# Patient Record
Sex: FEMALE | Race: WHITE | NOT HISPANIC OR LATINO | Employment: OTHER | URBAN - METROPOLITAN AREA
[De-identification: names, ages, dates, MRNs, and addresses within clinical notes are randomized per-mention and may not be internally consistent; named-entity substitution may affect disease eponyms.]

---

## 2023-11-28 ENCOUNTER — APPOINTMENT (OUTPATIENT)
Dept: PRIMARY CARE | Facility: CLINIC | Age: 63
End: 2023-11-28
Payer: MEDICARE

## 2023-12-11 ENCOUNTER — OFFICE VISIT (OUTPATIENT)
Dept: PRIMARY CARE | Facility: CLINIC | Age: 63
End: 2023-12-11
Payer: MEDICARE

## 2023-12-11 VITALS
SYSTOLIC BLOOD PRESSURE: 118 MMHG | HEIGHT: 62 IN | BODY MASS INDEX: 40.3 KG/M2 | HEART RATE: 71 BPM | OXYGEN SATURATION: 96 % | WEIGHT: 219 LBS | DIASTOLIC BLOOD PRESSURE: 80 MMHG

## 2023-12-11 DIAGNOSIS — I10 PRIMARY HYPERTENSION: ICD-10-CM

## 2023-12-11 DIAGNOSIS — Z76.89 ENCOUNTER TO ESTABLISH CARE: Primary | ICD-10-CM

## 2023-12-11 DIAGNOSIS — D68.51 FACTOR V LEIDEN MUTATION (MULTI): ICD-10-CM

## 2023-12-11 PROBLEM — E55.9 VITAMIN D DEFICIENCY: Status: ACTIVE | Noted: 2023-12-11

## 2023-12-11 PROBLEM — J32.0 LEFT MAXILLARY SINUSITIS: Status: ACTIVE | Noted: 2023-12-11

## 2023-12-11 PROCEDURE — 1036F TOBACCO NON-USER: CPT

## 2023-12-11 PROCEDURE — 99203 OFFICE O/P NEW LOW 30 MIN: CPT

## 2023-12-11 PROCEDURE — 3079F DIAST BP 80-89 MM HG: CPT

## 2023-12-11 PROCEDURE — 1159F MED LIST DOCD IN RCRD: CPT

## 2023-12-11 PROCEDURE — 1126F AMNT PAIN NOTED NONE PRSNT: CPT

## 2023-12-11 PROCEDURE — 3074F SYST BP LT 130 MM HG: CPT

## 2023-12-11 PROCEDURE — 99213 OFFICE O/P EST LOW 20 MIN: CPT

## 2023-12-11 RX ORDER — PHENYLEPHRINE HCL 10 MG
1 TABLET ORAL EVERY 24 HOURS
COMMUNITY

## 2023-12-11 RX ORDER — LISINOPRIL AND HYDROCHLOROTHIAZIDE 20; 25 MG/1; MG/1
1 TABLET ORAL EVERY 24 HOURS
Qty: 90 TABLET | Refills: 2 | Status: SHIPPED | OUTPATIENT
Start: 2023-12-11

## 2023-12-11 RX ORDER — LISINOPRIL AND HYDROCHLOROTHIAZIDE 20; 25 MG/1; MG/1
1 TABLET ORAL EVERY 24 HOURS
COMMUNITY
Start: 2017-12-26 | End: 2023-12-11 | Stop reason: SDUPTHER

## 2023-12-11 ASSESSMENT — PAIN SCALES - GENERAL: PAINLEVEL: 0-NO PAIN

## 2023-12-11 ASSESSMENT — ENCOUNTER SYMPTOMS
DIZZINESS: 0
PALPITATIONS: 0
LIGHT-HEADEDNESS: 0
FATIGUE: 0
SHORTNESS OF BREATH: 0

## 2023-12-11 ASSESSMENT — PATIENT HEALTH QUESTIONNAIRE - PHQ9
2. FEELING DOWN, DEPRESSED OR HOPELESS: NOT AT ALL
1. LITTLE INTEREST OR PLEASURE IN DOING THINGS: NOT AT ALL
SUM OF ALL RESPONSES TO PHQ9 QUESTIONS 1 AND 2: 0

## 2023-12-11 NOTE — PROGRESS NOTES
"Subjective   Patient ID: Ace Mirza is a 63 y.o. female who presents for Establish Care (Pt is here to establish care with no concerns and needs refills /la).    Hx HTN, vit-D deficiency,  + Factor V Leiden (no hx of VTE's), BCC    Other providers: Previous PCP Brionna Dutta, CNP, Dermatology for recurrent BCC every 6 months for skin checks, Dr. Gillian Garrett Ob/gyn).     HTN: controlled. On lisinopril 20 - 25 mg. No medication side effects. Home BP rarely checks. Denies chest pain, heart palpitations, SOB, dizziness, headaches, changes of vision, syncope, leg swelling.          Review of Systems   Constitutional:  Negative for fatigue.   Eyes:  Negative for visual disturbance.   Respiratory:  Negative for shortness of breath.    Cardiovascular:  Negative for chest pain, palpitations and leg swelling.   Neurological:  Negative for dizziness, syncope and light-headedness.       Objective   /80   Pulse 71   Ht 1.575 m (5' 2\")   Wt 99.3 kg (219 lb)   SpO2 96%   BMI 40.06 kg/m²     Physical Exam  Constitutional:       General: She is not in acute distress.     Appearance: Normal appearance.   Cardiovascular:      Rate and Rhythm: Normal rate and regular rhythm.      Heart sounds: No murmur heard.  Pulmonary:      Effort: Pulmonary effort is normal.      Breath sounds: Normal breath sounds. No wheezing, rhonchi or rales.   Skin:     General: Skin is warm and dry.      Findings: No rash.   Neurological:      Mental Status: She is alert.   Psychiatric:         Mood and Affect: Mood and affect normal.         Assessment/Plan   Diagnoses and all orders for this visit:  Encounter to establish care  Primary hypertension  -     lisinopriL-hydrochlorothiazide 20-25 mg tablet; Take 1 tablet by mouth once every 24 hours.  Factor V Leiden mutation (CMS/HCC)  -     Referral to Hematology and Oncology; Future  -Discussed increase risk of clotting, on long flights/travel should wear compression stockings.   -Follow-up one " month for Welcome to Medicare.

## 2024-01-15 ENCOUNTER — OFFICE VISIT (OUTPATIENT)
Dept: PRIMARY CARE | Facility: CLINIC | Age: 64
End: 2024-01-15
Payer: MEDICARE

## 2024-01-15 ENCOUNTER — LAB (OUTPATIENT)
Dept: LAB | Facility: LAB | Age: 64
End: 2024-01-15
Payer: MEDICARE

## 2024-01-15 VITALS
SYSTOLIC BLOOD PRESSURE: 126 MMHG | OXYGEN SATURATION: 97 % | WEIGHT: 221 LBS | HEART RATE: 76 BPM | DIASTOLIC BLOOD PRESSURE: 82 MMHG | HEIGHT: 62 IN | BODY MASS INDEX: 40.67 KG/M2

## 2024-01-15 DIAGNOSIS — Z13.6 SCREENING FOR CARDIOVASCULAR CONDITION: ICD-10-CM

## 2024-01-15 DIAGNOSIS — Z11.59 NEED FOR HEPATITIS C SCREENING TEST: ICD-10-CM

## 2024-01-15 DIAGNOSIS — Z80.8 FAMILY HISTORY OF THYROID CANCER: ICD-10-CM

## 2024-01-15 DIAGNOSIS — D68.51 FACTOR V LEIDEN MUTATION (MULTI): ICD-10-CM

## 2024-01-15 DIAGNOSIS — E55.9 VITAMIN D DEFICIENCY: ICD-10-CM

## 2024-01-15 DIAGNOSIS — I10 ESSENTIAL HYPERTENSION: ICD-10-CM

## 2024-01-15 DIAGNOSIS — Z23 NEED FOR VACCINATION: ICD-10-CM

## 2024-01-15 DIAGNOSIS — Z13.1 DIABETES MELLITUS SCREENING: ICD-10-CM

## 2024-01-15 DIAGNOSIS — Z00.00 ROUTINE GENERAL MEDICAL EXAMINATION AT HEALTH CARE FACILITY: Primary | ICD-10-CM

## 2024-01-15 LAB
25(OH)D3 SERPL-MCNC: 79 NG/ML (ref 31–100)
ALBUMIN SERPL-MCNC: 4.4 G/DL (ref 3.5–5)
ALP BLD-CCNC: 72 U/L (ref 35–125)
ALT SERPL-CCNC: 51 U/L (ref 5–40)
ANION GAP SERPL CALC-SCNC: 12 MMOL/L
AST SERPL-CCNC: 28 U/L (ref 5–40)
BASOPHILS # BLD AUTO: 0.09 X10*3/UL (ref 0–0.1)
BASOPHILS NFR BLD AUTO: 1.1 %
BILIRUB SERPL-MCNC: 0.6 MG/DL (ref 0.1–1.2)
BUN SERPL-MCNC: 19 MG/DL (ref 8–25)
CALCIUM SERPL-MCNC: 9.9 MG/DL (ref 8.5–10.4)
CHLORIDE SERPL-SCNC: 105 MMOL/L (ref 97–107)
CHOLEST SERPL-MCNC: 179 MG/DL (ref 133–200)
CHOLEST/HDLC SERPL: 4.7 {RATIO}
CO2 SERPL-SCNC: 26 MMOL/L (ref 24–31)
CREAT SERPL-MCNC: 0.8 MG/DL (ref 0.4–1.6)
EGFRCR SERPLBLD CKD-EPI 2021: 82 ML/MIN/1.73M*2
EOSINOPHIL # BLD AUTO: 0.08 X10*3/UL (ref 0–0.7)
EOSINOPHIL NFR BLD AUTO: 1 %
ERYTHROCYTE [DISTWIDTH] IN BLOOD BY AUTOMATED COUNT: 13.3 % (ref 11.5–14.5)
GLUCOSE SERPL-MCNC: 104 MG/DL (ref 65–99)
HCT VFR BLD AUTO: 44.4 % (ref 36–46)
HCV AB SER QL: NONREACTIVE
HDLC SERPL-MCNC: 38 MG/DL
HGB BLD-MCNC: 14.5 G/DL (ref 12–16)
IMM GRANULOCYTES # BLD AUTO: 0.06 X10*3/UL (ref 0–0.7)
IMM GRANULOCYTES NFR BLD AUTO: 0.7 % (ref 0–0.9)
LDLC SERPL CALC-MCNC: 101 MG/DL (ref 65–130)
LYMPHOCYTES # BLD AUTO: 2.65 X10*3/UL (ref 1.2–4.8)
LYMPHOCYTES NFR BLD AUTO: 32.6 %
MCH RBC QN AUTO: 31.7 PG (ref 26–34)
MCHC RBC AUTO-ENTMCNC: 32.7 G/DL (ref 32–36)
MCV RBC AUTO: 97 FL (ref 80–100)
MONOCYTES # BLD AUTO: 0.44 X10*3/UL (ref 0.1–1)
MONOCYTES NFR BLD AUTO: 5.4 %
NEUTROPHILS # BLD AUTO: 4.81 X10*3/UL (ref 1.2–7.7)
NEUTROPHILS NFR BLD AUTO: 59.2 %
NRBC BLD-RTO: 0 /100 WBCS (ref 0–0)
PLATELET # BLD AUTO: 348 X10*3/UL (ref 150–450)
POTASSIUM SERPL-SCNC: 4.3 MMOL/L (ref 3.4–5.1)
PROT SERPL-MCNC: 6.7 G/DL (ref 5.9–7.9)
RBC # BLD AUTO: 4.58 X10*6/UL (ref 4–5.2)
SODIUM SERPL-SCNC: 143 MMOL/L (ref 133–145)
TRIGL SERPL-MCNC: 202 MG/DL (ref 40–150)
TSH SERPL DL<=0.05 MIU/L-ACNC: 1.95 MIU/L (ref 0.27–4.2)
WBC # BLD AUTO: 8.1 X10*3/UL (ref 4.4–11.3)

## 2024-01-15 PROCEDURE — 84443 ASSAY THYROID STIM HORMONE: CPT

## 2024-01-15 PROCEDURE — 85025 COMPLETE CBC W/AUTO DIFF WBC: CPT

## 2024-01-15 PROCEDURE — 1125F AMNT PAIN NOTED PAIN PRSNT: CPT

## 2024-01-15 PROCEDURE — 1036F TOBACCO NON-USER: CPT

## 2024-01-15 PROCEDURE — 36415 COLL VENOUS BLD VENIPUNCTURE: CPT

## 2024-01-15 PROCEDURE — G0402 INITIAL PREVENTIVE EXAM: HCPCS

## 2024-01-15 PROCEDURE — G0009 ADMIN PNEUMOCOCCAL VACCINE: HCPCS

## 2024-01-15 PROCEDURE — 3079F DIAST BP 80-89 MM HG: CPT

## 2024-01-15 PROCEDURE — 80061 LIPID PANEL: CPT

## 2024-01-15 PROCEDURE — 82306 VITAMIN D 25 HYDROXY: CPT

## 2024-01-15 PROCEDURE — 1159F MED LIST DOCD IN RCRD: CPT

## 2024-01-15 PROCEDURE — 80053 COMPREHEN METABOLIC PANEL: CPT

## 2024-01-15 PROCEDURE — 86803 HEPATITIS C AB TEST: CPT

## 2024-01-15 PROCEDURE — 3074F SYST BP LT 130 MM HG: CPT

## 2024-01-15 PROCEDURE — 90677 PCV20 VACCINE IM: CPT

## 2024-01-15 ASSESSMENT — ENCOUNTER SYMPTOMS
PALPITATIONS: 0
DYSURIA: 0
DIFFICULTY URINATING: 0
BLOOD IN STOOL: 0
MYALGIAS: 0
SHORTNESS OF BREATH: 0
NAUSEA: 0
FATIGUE: 0
ADENOPATHY: 0
ARTHRALGIAS: 1
HEMATURIA: 0
COUGH: 0
LIGHT-HEADEDNESS: 0
DIAPHORESIS: 0
VOMITING: 0
WHEEZING: 0
SORE THROAT: 0
DIARRHEA: 0
FEVER: 0

## 2024-01-15 ASSESSMENT — PAIN SCALES - GENERAL: PAINLEVEL: 6

## 2024-01-15 NOTE — PROGRESS NOTES
Subjective   Patient ID: Ace Mirza is a 63 y.o. female who presents for Annual Exam.    PAP 12-5-19 NEG HPV NEG H/O cone biopsy 30 years ago.  MAMMO 1-30-23, will do fast breast MRI.  DEXA 1-30-23  COLON 2021 good for 10 years.     CHAPERONE: DECLINED. Had physical yesterday, BP normal. Has gained weight.     Cramping has not been as bad this past year. Had ultrasound last year with large fibroid uterus. No VB.     Gets mams at [facility], had biopsy on the left, has a chip. Has a h/o basal cell carcinoma, had some removed on leg.     Stopped periods with fibroid removal 5 years ago. Had a fibroid removed vaginally. No VB.     - passed one year ago, Covid, c/s x2. 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 Takes vit D.     Father liposarcoma, passed. Mom living, healthy. no female cancers.     No vaginal dryness.       Review of Systems   Constitutional:  Positive for unexpected weight change.   Psychiatric/Behavioral:  Positive for sleep disturbance.    All other systems reviewed and are negative.      Objective   Constitutional: Alert and in no acute distress. Well developed, well nourished.  Neck: no neck asymmetry. Supple and thyroid not enlarged and there were no palpable thyroid nodules.  Chest: Breasts normal appearance, no nipple discharge and no skin changes and palpation of breasts and axillae: no palpable mass and no axillary lymphadenopathy.  Abdomen:VERTICAL MIDLINE INCISION. soft nontender; no abdominal mass palpated, no organomegaly and no hernias.  Genitourinary: external genitalia: normal, no inguinal lymphadenopathy, Bartholin's urethral and [place]'s glands: normal, urethra: normal and bladder: normal on palpation.  Vagina: normal.  Cervix: normal.  Uterus: normal.   Right adnexa/parametria: normal.  Left adnexa/parametria: normal.  Skin: normal skin color and pigmentation, normal skin turgor and no rash.  Psychiatric: alert and oriented x 3, affect normal to patient baseline and mood  appropriate.     Assessment/Plan   -Pap-HPV  -Lio-Ranjit, fast breast MRI.  -Discussed weight gain. Mentioned Whole[employer].  -pelvic ultrasound follow fibroids.

## 2024-01-15 NOTE — PROGRESS NOTES
[facility]: FAMILY MEDICINE  MEDICARE WELLNESS EXAM      Ace Mirza is a 63 y.o. female that is presenting today for Annual Exam (Pt is here for medicare physical /la).    Subjective   Hx HTN, vit-D deficiency, + Factor V Leiden (no hx of VTE's), BCC     Other providers: Previous PCP Brionna Dutta CNP, Dermatology for recurrent BCC every 6 months for skin checks, Dr. Gillian Garrett Ob/gyn), referral placed for hematologist (2/2024)     HTN: controlled. On lisinopril 20 - 25 mg. No medication side effects. Home BP rarely checks. Denies chest pain, heart palpitations, SOB, dizziness, headaches, changes of vision, syncope, leg swelling    Denies concerns/changes in hearing.       Review of Systems   Constitutional:  Negative for diaphoresis, fatigue and fever.   HENT:  Negative for congestion, hearing loss and sore throat.    Eyes:  Negative for visual disturbance.   Respiratory:  Negative for cough, shortness of breath and wheezing.    Cardiovascular:  Negative for chest pain, palpitations and leg swelling.   Gastrointestinal:  Negative for blood in stool, diarrhea, nausea and vomiting.   Genitourinary:  Negative for difficulty urinating, dysuria and hematuria.   Musculoskeletal:  Positive for arthralgias. Negative for myalgias.   Skin:  Negative for rash.   Allergic/Immunologic: Negative for immunocompromised state.   Neurological:  Negative for syncope and light-headedness.   Hematological:  Negative for adenopathy.   Psychiatric/Behavioral:  Negative for suicidal ideas.      ACTIVITIES OF DAILY LIVING:  Basic ADLs:  Problems with Bathing, Dressing, Toileting, Transferring, Continence, Feeding No  Instrumental ADLs:  Problems with Ability to use phone, Shopping, Cooking, House-keeping, Laundry, Transportation, Medication Management, Finance Management No    Advance Care Planning   Advanced Care Planning was discussed with patient:  The patient has an active advanced care plan on file. The patient has an  active surrogate decision-maker on file. The patient does not have an advanced care plan on file.    History    Past Medical History:   Diagnosis Date    Personal history of other diseases of the circulatory system     History of hypertension    Personal history of other malignant neoplasm of skin     History of basal cell carcinoma (BCC)     Past Surgical History:   Procedure Laterality Date    BREAST LUMPECTOMY  2018    Breast Surgery Lumpectomy     SECTION, CLASSIC  2018     Section    OTHER SURGICAL HISTORY  2018    Cervical Conization By Laser    OTHER SURGICAL HISTORY  2018    Uterine Surgery    SKIN CANCER DESTRUCTION       Family History   Problem Relation Name Age of Onset    Lung disease Mother      Stroke Mother      Pulmonary fibrosis Mother      Melanoma Mother      Hypertension Mother      Arthritis Mother      Other (liposarcoma) Father      Kidney disease Father      Diabetes Father      Thyroid cancer Sister      Melanoma Sister      Colon cancer Mother's Brother      Breast cancer Neg Hx      Uterine cancer Neg Hx      Cervical cancer Neg Hx      Ovarian cancer Neg Hx      Prostate cancer Neg Hx      Heart attack Neg Hx       Allergies   Allergen Reactions    Amoxicillin GI Upset, Nausea Only and Nausea/vomiting    Sulfa (Sulfonamide Antibiotics) Rash     Current Outpatient Medications on File Prior to Visit   Medication Sig Dispense Refill    D3-red wine-resveratrol-malt 5,000-200 unit-mg capsule once every 24 hours.      lisinopriL-hydrochlorothiazide 20-25 mg tablet Take 1 tablet by mouth once every 24 hours. 90 tablet 2    omega 3-dha-epa-fish oil 600 mg-216 mg- 324 mg-1,200 mg capsule 1 capsule (1,200 mg) once every 24 hours.       No current facility-administered medications on file prior to visit.     Immunization History   Administered Date(s) Administered    Flu vaccine (IIV4), preservative free *Check age/dose* 10/06/2022, 10/18/2023    Flu vaccine,  quadrivalent, no egg protein, age 6 month or greater (FLUCELVAX) 10/15/2020    Influenza, injectable, quadrivalent 12/23/2013, 10/09/2015, 12/23/2016, 09/05/2017, 10/17/2018, 11/15/2021    Influenza, seasonal, injectable 10/13/2003, 10/13/2008, 09/21/2009, 09/15/2010, 11/15/2011, 08/27/2012, 10/20/2014    Pfizer Purple Cap SARS-CoV-2 03/17/2021, 04/16/2021, 12/10/2021    RSV, 60 Years And Older (AREXVY) 10/18/2023    Td (adult), unspecified 12/23/2016    Tdap vaccine, age 7 year and older (BOOSTRIX) 04/13/2007    Zoster vaccine, recombinant, adult (SHINGRIX) 10/15/2020, 01/13/2021     Patient's medical history was reviewed and updated either before or during this encounter.    Objective   Vitals:    01/15/24 0939   BP: 126/82   Pulse: 76   SpO2: 97%      Physical Exam  Constitutional:       General: She is not in acute distress.     Appearance: Normal appearance.   HENT:      Right Ear: Tympanic membrane and ear canal normal.      Left Ear: Tympanic membrane and ear canal normal.      Nose: Nose normal.      Mouth/Throat:      Mouth: Mucous membranes are moist.      Pharynx: Oropharynx is clear. No oropharyngeal exudate or posterior oropharyngeal erythema.   Eyes:      Extraocular Movements: Extraocular movements intact.      Conjunctiva/sclera: Conjunctivae normal.      Pupils: Pupils are equal, round, and reactive to light.   Neck:      Thyroid: No thyroid mass or thyromegaly.   Cardiovascular:      Rate and Rhythm: Normal rate and regular rhythm.      Pulses: Normal pulses.           Posterior tibial pulses are 2+ on the right side and 2+ on the left side.      Heart sounds: No murmur heard.     No gallop.   Pulmonary:      Effort: Pulmonary effort is normal.      Breath sounds: No wheezing, rhonchi or rales.   Abdominal:      General: Bowel sounds are normal.      Palpations: Abdomen is soft. There is no hepatomegaly, splenomegaly or mass.      Tenderness: There is no abdominal tenderness. There is no guarding.    Musculoskeletal:         General: Normal range of motion.   Lymphadenopathy:      Cervical: No cervical adenopathy.   Skin:     General: Skin is warm.      Findings: No rash.   Neurological:      General: No focal deficit present.      Mental Status: She is alert.   Psychiatric:         Mood and Affect: Mood normal.         Thought Content: Thought content normal.         Assessment/Plan    Diagnoses and all orders for this visit:  Routine general medical examination at health care facility  Vitamin D deficiency  -     Vitamin D 25-Hydroxy,Total (for eval of Vitamin D levels); Future  Essential hypertension  -     CBC and Auto Differential; Future  Factor V Leiden mutation (CMS/HCC)  -     CBC and Auto Differential; Future  Diabetes mellitus screening  -     Comprehensive Metabolic Panel; Future  Screening for cardiovascular condition  -     Lipid panel; Future  Need for vaccination  -     Pneumococcal conjugate vaccine 20-valent IM  Need for hepatitis C screening test  -     Hepatitis C antibody; Future  Family history of thyroid cancer  -     Tsh With Reflex To Free T4 If Abnormal; Future    Patient Active Problem List   Diagnosis    Essential hypertension    Left maxillary sinusitis    Vitamin D deficiency    Factor V Leiden mutation (CMS/HCC)    Family history of thyroid cancer       Diagnoses and all orders for this visit:  Routine general medical examination at [facility] care facility  Vitamin D deficiency  -     Vitamin D 25-Hydroxy,Total (for eval of Vitamin D levels); Future  Essential hypertension  -     CBC and Auto Differential; Future  Factor V Leiden mutation (CMS/HCC)  -     CBC and Auto Differential; Future  Diabetes mellitus screening  -     Comprehensive Metabolic Panel; Future  Screening for cardiovascular condition  -     Lipid panel; Future  Need for vaccination  -     Pneumococcal conjugate vaccine 20-valent IM  Need for hepatitis C screening test  -     Hepatitis C antibody; Future  Family  history of thyroid cancer  -     Tsh With Reflex To Free T4 If Abnormal; Future    The patient was encouraged to ensure that any/all documentation is accurate and up to date, and that our office be provided a copy in the event that anything changes.    Follow-up 6 months CRYSTAL Jett PA-C

## 2024-01-16 ENCOUNTER — OFFICE VISIT (OUTPATIENT)
Dept: OBSTETRICS AND GYNECOLOGY | Facility: CLINIC | Age: 64
End: 2024-01-16
Payer: MEDICARE

## 2024-01-16 ENCOUNTER — TELEPHONE (OUTPATIENT)
Dept: PRIMARY CARE | Facility: CLINIC | Age: 64
End: 2024-01-16

## 2024-01-16 VITALS — SYSTOLIC BLOOD PRESSURE: 138 MMHG | DIASTOLIC BLOOD PRESSURE: 92 MMHG | BODY MASS INDEX: 40.6 KG/M2 | WEIGHT: 222 LBS

## 2024-01-16 DIAGNOSIS — R92.2 DENSE BREASTS: ICD-10-CM

## 2024-01-16 DIAGNOSIS — Z12.4 CERVICAL CANCER SCREENING: ICD-10-CM

## 2024-01-16 DIAGNOSIS — Z12.31 ENCOUNTER FOR SCREENING MAMMOGRAM FOR BREAST CANCER: ICD-10-CM

## 2024-01-16 DIAGNOSIS — D25.9 UTERINE LEIOMYOMA, UNSPECIFIED LOCATION: ICD-10-CM

## 2024-01-16 DIAGNOSIS — R92.30 DENSE BREASTS: ICD-10-CM

## 2024-01-16 DIAGNOSIS — Z01.419 WELL WOMAN EXAM WITH ROUTINE GYNECOLOGICAL EXAM: Primary | ICD-10-CM

## 2024-01-16 PROBLEM — R10.2 PAIN IN PELVIS: Status: ACTIVE | Noted: 2022-12-27

## 2024-01-16 PROBLEM — J01.10 ACUTE FRONTAL SINUSITIS: Status: ACTIVE | Noted: 2024-01-16

## 2024-01-16 PROBLEM — Z86.79 HISTORY OF HYPERTENSION: Status: ACTIVE | Noted: 2024-01-16

## 2024-01-16 PROBLEM — E78.5 HYPERLIPIDEMIA: Status: ACTIVE | Noted: 2024-01-16

## 2024-01-16 PROBLEM — F41.1 GAD (GENERALIZED ANXIETY DISORDER): Status: ACTIVE | Noted: 2024-01-16

## 2024-01-16 PROBLEM — J30.1 ALLERGIC RHINITIS DUE TO POLLEN: Status: ACTIVE | Noted: 2024-01-16

## 2024-01-16 PROBLEM — Z85.828 HISTORY OF BASAL CELL CARCINOMA (BCC): Status: ACTIVE | Noted: 2024-01-16

## 2024-01-16 PROBLEM — F51.01 PRIMARY INSOMNIA: Status: ACTIVE | Noted: 2024-01-16

## 2024-01-16 PROBLEM — R39.9 SYMPTOMS INVOLVING URINARY SYSTEM: Status: ACTIVE | Noted: 2024-01-16

## 2024-01-16 PROBLEM — N90.89 LESION OF VULVA: Status: ACTIVE | Noted: 2024-01-16

## 2024-01-16 PROBLEM — N95.9 PERIMENOPAUSAL DISORDER: Status: ACTIVE | Noted: 2024-01-16

## 2024-01-16 PROBLEM — F41.9 ANXIETY: Status: ACTIVE | Noted: 2022-11-21

## 2024-01-16 PROBLEM — U09.9 POST COVID-19 CONDITION, UNSPECIFIED: Status: ACTIVE | Noted: 2024-01-16

## 2024-01-16 PROCEDURE — 1036F TOBACCO NON-USER: CPT | Performed by: OBSTETRICS & GYNECOLOGY

## 2024-01-16 PROCEDURE — 3080F DIAST BP >= 90 MM HG: CPT | Performed by: OBSTETRICS & GYNECOLOGY

## 2024-01-16 PROCEDURE — 1159F MED LIST DOCD IN RCRD: CPT | Performed by: OBSTETRICS & GYNECOLOGY

## 2024-01-16 PROCEDURE — 99397 PER PM REEVAL EST PAT 65+ YR: CPT | Performed by: OBSTETRICS & GYNECOLOGY

## 2024-01-16 PROCEDURE — 1125F AMNT PAIN NOTED PAIN PRSNT: CPT | Performed by: OBSTETRICS & GYNECOLOGY

## 2024-01-16 PROCEDURE — 88175 CYTOPATH C/V AUTO FLUID REDO: CPT

## 2024-01-16 PROCEDURE — 87624 HPV HI-RISK TYP POOLED RSLT: CPT

## 2024-01-16 PROCEDURE — 3075F SYST BP GE 130 - 139MM HG: CPT | Performed by: OBSTETRICS & GYNECOLOGY

## 2024-01-16 ASSESSMENT — ENCOUNTER SYMPTOMS
UNEXPECTED WEIGHT CHANGE: 1
SLEEP DISTURBANCE: 1

## 2024-01-16 NOTE — TELEPHONE ENCOUNTER
Very mild increase in sugar, triglycerides and single liver enzyme that all can be treated with lifestyle changes;  routine exercise and healthy diet of healthy fats like olive oil and nuts and increase vegetable intake. Rest of labs normal.

## 2024-02-06 ENCOUNTER — TELEMEDICINE (OUTPATIENT)
Dept: HEMATOLOGY/ONCOLOGY | Facility: CLINIC | Age: 64
End: 2024-02-06
Payer: MEDICARE

## 2024-02-06 DIAGNOSIS — D68.51 FACTOR V LEIDEN MUTATION (MULTI): ICD-10-CM

## 2024-02-06 PROCEDURE — 1160F RVW MEDS BY RX/DR IN RCRD: CPT | Performed by: INTERNAL MEDICINE

## 2024-02-06 PROCEDURE — 1159F MED LIST DOCD IN RCRD: CPT | Performed by: INTERNAL MEDICINE

## 2024-02-06 PROCEDURE — 1036F TOBACCO NON-USER: CPT | Performed by: INTERNAL MEDICINE

## 2024-02-06 PROCEDURE — 99204 OFFICE O/P NEW MOD 45 MIN: CPT | Performed by: INTERNAL MEDICINE

## 2024-02-06 PROCEDURE — 1125F AMNT PAIN NOTED PAIN PRSNT: CPT | Performed by: INTERNAL MEDICINE

## 2024-02-06 NOTE — PATIENT INSTRUCTIONS
Today you had a call with Dr. Holliday.  No need to schedule a follow up at this time.  If you should develop a clot in the future then it would be recommended to return for an evaluation.  However at this time not needed.    Please call the office for any questions or concerns.  [facility] is strongly encouraging all patients to access their MyChart for all results and to communicate with their provider for non-urgent messages.  If an urgent/same day/sick concern response is needed, please call the office at [phone]. Thank You!

## 2024-02-06 NOTE — PROGRESS NOTES
Patient ID: Ace Mirza is a 63 y.o. female.  Referring Physician: Cara Jett PA-C  [address] [address]  [place] [address]  Primary Care Provider: Cara Jett PA-C  Visit Type:  Initial Visit     Verbal consent was requested and obtained from patient on this date for a telehealth visit.    Subjective    HPI  Patient gives a history that her mother had a DVT and was tested and was found to have factor V Leiden positivity.  She was scheduled to see hematology hence this visit.  Review of her laboratory findings confirm heterozygosity for factor V Leiden.  She has no history of PE and no history of DVT.  I have explained to her that in the absence of an event ever in her life there is no indication to treat she however is to be aware of her propensity and if she has surgery or long longer visits and long airplane visits to walk around and to take commonsense measures so as not to create situations where DVT might occur.  She is discharged to follow-up with the PCP and to present to hematology on a as needed basis.  Review of Systems - Oncology     Objective   BSA: There is no height or weight on file to calculate BSA.  There were no vitals taken for this visit.     has a past medical history of Personal history of other diseases of the circulatory system and Personal history of other malignant neoplasm of skin.   has a past surgical history that includes  section, classic (2018); Other surgical history (2018); Other surgical history (2018); Breast lumpectomy (2018); and Skin cancer destruction.  Family History   Problem Relation Name Age of Onset    Lung disease Mother      Stroke Mother      Pulmonary fibrosis Mother      Melanoma Mother      Hypertension Mother      Arthritis Mother      Other (liposarcoma) Father      Kidney disease Father      Diabetes Father      Thyroid cancer Sister      Melanoma Sister      Colon cancer Mother's Brother      Breast cancer Neg Hx       Uterine cancer Neg Hx      Cervical cancer Neg Hx      Ovarian cancer Neg Hx      Prostate cancer Neg Hx      Heart attack Neg Hx       Oncology History    No history exists.       Ace Mirza  reports that she has never smoked. She has never used smokeless tobacco.  She  reports that she does not currently use alcohol.  She  reports no history of drug use.    Physical Exam    WBC   Date/Time Value Ref Range Status   01/15/2024 11:33 AM 8.1 4.4 - 11.3 x10*3/uL Final   11/22/2022 02:30 PM 7.1 4.5 - 11.0 K/UL Final   11/15/2021 02:03 PM 6.4 4.5 - 11.0 K/UL Final   06/19/2020 08:29 AM 6.8 4.5 - 11.0 K/UL Final     nRBC   Date Value Ref Range Status   01/15/2024 0.0 0.0 - 0.0 /100 WBCs Final   11/22/2022 0 0 /100 WBC Final   11/15/2021 0 0 /100 WBC Final     Comment:     Performed at [address] [address]   06/19/2020 0 0 /100 WBC Final     Comment:     Performed at [address] [address]     RBC   Date Value Ref Range Status   01/15/2024 4.58 4.00 - 5.20 x10*6/uL Final   11/22/2022 4.46 4.0 - 4.9 M/UL Final   11/15/2021 4.43 4.0 - 4.9 M/UL Final   06/19/2020 4.64 4.0 - 4.9 M/UL Final     Hemoglobin   Date Value Ref Range Status   01/15/2024 14.5 12.0 - 16.0 g/dL Final   11/22/2022 13.9 12.0 - 15.0 GM/DL Final   11/15/2021 14.0 12.0 - 15.0 GM/DL Final   06/19/2020 14.1 12.0 - 15.0 GM/DL Final     Hematocrit   Date Value Ref Range Status   01/15/2024 44.4 36.0 - 46.0 % Final   11/22/2022 42.9 36 - 44 % Final   11/15/2021 43.3 36 - 44 % Final   06/19/2020 45.1 (H) 36 - 44 % Final     MCV   Date/Time Value Ref Range Status   01/15/2024 11:33 AM 97 80 - 100 fL Final   11/22/2022 02:30 PM 96.2 80 - 100 FL Final   11/15/2021 02:03 PM 97.7 80 - 100 FL Final   06/19/2020 08:29 AM 97.2 80 - 100 FL Final     MCH   Date/Time Value Ref Range Status   01/15/2024 11:33 AM 31.7 26.0 - 34.0 pg Final   11/22/2022 02:30 PM 31.2 26 - 34 PG Final   11/15/2021 02:03 PM 31.6 26 - 34 PG  Final     MCHC   Date/Time Value Ref Range Status   01/15/2024 11:33 AM 32.7 32.0 - 36.0 g/dL Final   11/22/2022 02:30 PM 32.4 31 - 37 % Final   11/15/2021 02:03 PM 32.3 31 - 37 % Final   06/19/2020 08:29 AM 31.3 31 - 37 % Final     RDW   Date/Time Value Ref Range Status   01/15/2024 11:33 AM 13.3 11.5 - 14.5 % Final     Platelets   Date/Time Value Ref Range Status   01/15/2024 11:33  150 - 450 x10*3/uL Final   11/22/2022 02:30  150 - 450 K/UL Final   11/15/2021 02:03  150 - 450 K/UL Final   06/19/2020 08:29  150 - 450 K/UL Final     MPV   Date/Time Value Ref Range Status   11/22/2022 02:30 PM 9.9 7.0 - 12.6 CU Final   11/15/2021 02:03 PM 10.3 7.0 - 12.6 CU Final   06/19/2020 08:29 AM 10.1 7.0 - 12.6 CU Final     Neutrophils %   Date/Time Value Ref Range Status   01/15/2024 11:33 AM 59.2 40.0 - 80.0 % Final     Immature Granulocytes %, Automated   Date/Time Value Ref Range Status   01/15/2024 11:33 AM 0.7 0.0 - 0.9 % Final     Comment:     Immature Granulocyte Count (IG) includes promyelocytes, myelocytes and metamyelocytes but does not include bands. Percent differential counts (%) should be interpreted in the context of the absolute cell counts (cells/UL).     Lymphocytes %   Date/Time Value Ref Range Status   01/15/2024 11:33 AM 32.6 13.0 - 44.0 % Final   11/22/2022 02:30 PM 34.10 20 - 40 % Final     Monocytes %   Date/Time Value Ref Range Status   01/15/2024 11:33 AM 5.4 2.0 - 10.0 % Final   11/22/2022 02:30 PM 6.90 0 - 8 % Final     Eosinophils %   Date/Time Value Ref Range Status   01/15/2024 11:33 AM 1.0 0.0 - 6.0 % Final     Basophils %   Date/Time Value Ref Range Status   01/15/2024 11:33 AM 1.1 0.0 - 2.0 % Final   11/22/2022 02:30 PM 1.00 0 - 1 % Final     Neutrophils Absolute   Date/Time Value Ref Range Status   01/15/2024 11:33 AM 4.81 1.20 - 7.70 x10*3/uL Final     Comment:     Percent differential counts (%) should be interpreted in the context of the absolute cell counts  (cells/uL).   11/22/2022 02:30 PM 4.03 1.8 - 7.7 K/UL Final     Immature Granulocytes Absolute, Automated   Date/Time Value Ref Range Status   01/15/2024 11:33 AM 0.06 0.00 - 0.70 x10*3/uL Final   11/22/2022 02:30 PM 0.03 0.0 - 0.1 K/UL Final     Lymphocytes Absolute   Date/Time Value Ref Range Status   01/15/2024 11:33 AM 2.65 1.20 - 4.80 x10*3/uL Final   11/22/2022 02:30 PM 2.43 1.2 - 3.2 K/UL Final     Monocytes Absolute   Date/Time Value Ref Range Status   01/15/2024 11:33 AM 0.44 0.10 - 1.00 x10*3/uL Final   11/22/2022 02:30 PM 0.49 0 - 0.8 K/UL Final     Eosinophils Absolute   Date/Time Value Ref Range Status   01/15/2024 11:33 AM 0.08 0.00 - 0.70 x10*3/uL Final   11/22/2022 02:30 PM 0.07 0 - 0.45 K/UL Final     Basophils Absolute   Date/Time Value Ref Range Status   01/15/2024 11:33 AM 0.09 0.00 - 0.10 x10*3/uL Final   11/22/2022 02:30 PM 0.07 0.00 - 0.22 K/UL Final       Assessment/Plan      Factor V Leiden PCR : Laboratory Accession Number: [id number] : Result: HETEROZYGOUS FOR Factor V Leiden     Patient has no history of VTE event either in the pulmonary vasculature or in the lower extremities.  Her mother was diagnosed with the genetic abnormality after DVT.  She was tested and is heterozygous.  Otherwise no evidence of VTE is noted.  I have explained to her that in the absence of an event she cannot be treated or does not have the indication for treatment.  She is discharged to follow-up with her PCP with caution as to commonsense measures to take when traveling long distances or to avoid sedentary lifestyle or activity use.    DC from him clinic to present as needed.     Diagnoses and all orders for this visit:  Factor V Leiden mutation (CMS/HCC)  -     Referral to Hematology and Oncology           López Holliday MD

## 2024-02-10 ENCOUNTER — HOSPITAL ENCOUNTER (OUTPATIENT)
Dept: RADIOLOGY | Facility: CLINIC | Age: 64
Discharge: HOME | End: 2024-02-10
Payer: MEDICARE

## 2024-02-10 VITALS — WEIGHT: 220 LBS | HEIGHT: 62 IN | BODY MASS INDEX: 40.48 KG/M2

## 2024-02-10 DIAGNOSIS — Z12.31 ENCOUNTER FOR SCREENING MAMMOGRAM FOR BREAST CANCER: ICD-10-CM

## 2024-02-10 PROCEDURE — 77067 SCR MAMMO BI INCL CAD: CPT

## 2024-04-14 ENCOUNTER — HOSPITAL ENCOUNTER (OUTPATIENT)
Dept: RADIOLOGY | Facility: HOSPITAL | Age: 64
Discharge: HOME | End: 2024-04-14

## 2024-04-14 ENCOUNTER — HOSPITAL ENCOUNTER (OUTPATIENT)
Dept: RADIOLOGY | Facility: HOSPITAL | Age: 64
Discharge: HOME | End: 2024-04-14
Payer: MEDICARE

## 2024-04-14 DIAGNOSIS — D25.9 UTERINE LEIOMYOMA, UNSPECIFIED LOCATION: ICD-10-CM

## 2024-04-14 DIAGNOSIS — R92.30 DENSE BREASTS: ICD-10-CM

## 2024-04-14 DIAGNOSIS — R92.2 DENSE BREASTS: ICD-10-CM

## 2024-04-14 PROCEDURE — 6100000003 BI MR BREAST BILATERAL WITH CONTRAST FAST SCREENING SELF PAY

## 2024-04-14 PROCEDURE — 76856 US EXAM PELVIC COMPLETE: CPT | Performed by: RADIOLOGY

## 2024-04-14 PROCEDURE — A9575 INJ GADOTERATE MEGLUMI 0.1ML: HCPCS | Performed by: OBSTETRICS & GYNECOLOGY

## 2024-04-14 PROCEDURE — 76830 TRANSVAGINAL US NON-OB: CPT | Performed by: RADIOLOGY

## 2024-04-14 PROCEDURE — 76856 US EXAM PELVIC COMPLETE: CPT

## 2024-04-14 PROCEDURE — 2550000001 HC RX 255 CONTRASTS: Performed by: OBSTETRICS & GYNECOLOGY

## 2024-04-14 RX ORDER — GADOTERATE MEGLUMINE 376.9 MG/ML
0.2 INJECTION INTRAVENOUS
Status: COMPLETED | OUTPATIENT
Start: 2024-04-14 | End: 2024-04-14

## 2024-04-14 RX ADMIN — GADOTERATE MEGLUMINE 20 ML: 376.9 INJECTION INTRAVENOUS at 10:16

## 2024-06-04 ENCOUNTER — DOCUMENTATION (OUTPATIENT)
Dept: PRIMARY CARE | Facility: CLINIC | Age: 64
End: 2024-06-04
Payer: MEDICARE

## 2024-07-15 ENCOUNTER — APPOINTMENT (OUTPATIENT)
Dept: PRIMARY CARE | Facility: CLINIC | Age: 64
End: 2024-07-15
Payer: MEDICARE

## 2024-07-16 ENCOUNTER — LAB (OUTPATIENT)
Dept: LAB | Facility: LAB | Age: 64
End: 2024-07-16
Payer: MEDICARE

## 2024-07-16 ENCOUNTER — OFFICE VISIT (OUTPATIENT)
Dept: PRIMARY CARE | Facility: CLINIC | Age: 64
End: 2024-07-16
Payer: MEDICARE

## 2024-07-16 VITALS
SYSTOLIC BLOOD PRESSURE: 126 MMHG | HEIGHT: 63 IN | BODY MASS INDEX: 39.87 KG/M2 | WEIGHT: 225 LBS | HEART RATE: 70 BPM | OXYGEN SATURATION: 95 % | DIASTOLIC BLOOD PRESSURE: 80 MMHG

## 2024-07-16 DIAGNOSIS — I10 PRIMARY HYPERTENSION: Primary | ICD-10-CM

## 2024-07-16 DIAGNOSIS — M16.12 PRIMARY OSTEOARTHRITIS OF LEFT HIP: ICD-10-CM

## 2024-07-16 DIAGNOSIS — L60.0 INGROWN TOENAIL OF BOTH FEET: ICD-10-CM

## 2024-07-16 DIAGNOSIS — I10 PRIMARY HYPERTENSION: ICD-10-CM

## 2024-07-16 PROBLEM — R10.2 PAIN IN PELVIS: Status: RESOLVED | Noted: 2022-12-27 | Resolved: 2024-07-16

## 2024-07-16 PROBLEM — Z86.79 HISTORY OF HYPERTENSION: Status: RESOLVED | Noted: 2024-01-16 | Resolved: 2024-07-16

## 2024-07-16 PROBLEM — J01.10 ACUTE FRONTAL SINUSITIS: Status: RESOLVED | Noted: 2024-01-16 | Resolved: 2024-07-16

## 2024-07-16 PROBLEM — F41.9 ANXIETY: Status: RESOLVED | Noted: 2022-11-21 | Resolved: 2024-07-16

## 2024-07-16 PROBLEM — R39.9 SYMPTOMS INVOLVING URINARY SYSTEM: Status: RESOLVED | Noted: 2024-01-16 | Resolved: 2024-07-16

## 2024-07-16 PROBLEM — J30.1 ALLERGIC RHINITIS DUE TO POLLEN: Status: RESOLVED | Noted: 2024-01-16 | Resolved: 2024-07-16

## 2024-07-16 PROBLEM — Z01.419 WELL WOMAN EXAM WITH ROUTINE GYNECOLOGICAL EXAM: Status: RESOLVED | Noted: 2024-01-16 | Resolved: 2024-07-16

## 2024-07-16 PROBLEM — U09.9 POST COVID-19 CONDITION, UNSPECIFIED: Status: RESOLVED | Noted: 2024-01-16 | Resolved: 2024-07-16

## 2024-07-16 PROBLEM — J32.0 LEFT MAXILLARY SINUSITIS: Status: RESOLVED | Noted: 2023-12-11 | Resolved: 2024-07-16

## 2024-07-16 PROBLEM — E78.5 HYPERLIPIDEMIA: Status: RESOLVED | Noted: 2024-01-16 | Resolved: 2024-07-16

## 2024-07-16 LAB
ANION GAP SERPL CALC-SCNC: 13 MMOL/L
BUN SERPL-MCNC: 20 MG/DL (ref 8–25)
CALCIUM SERPL-MCNC: 10 MG/DL (ref 8.5–10.4)
CHLORIDE SERPL-SCNC: 103 MMOL/L (ref 97–107)
CO2 SERPL-SCNC: 24 MMOL/L (ref 24–31)
CREAT SERPL-MCNC: 0.9 MG/DL (ref 0.4–1.6)
EGFRCR SERPLBLD CKD-EPI 2021: 71 ML/MIN/1.73M*2
GLUCOSE SERPL-MCNC: 106 MG/DL (ref 65–99)
POTASSIUM SERPL-SCNC: 4.3 MMOL/L (ref 3.4–5.1)
SODIUM SERPL-SCNC: 140 MMOL/L (ref 133–145)

## 2024-07-16 PROCEDURE — 1126F AMNT PAIN NOTED NONE PRSNT: CPT

## 2024-07-16 PROCEDURE — 1123F ACP DISCUSS/DSCN MKR DOCD: CPT

## 2024-07-16 PROCEDURE — 3074F SYST BP LT 130 MM HG: CPT

## 2024-07-16 PROCEDURE — 3079F DIAST BP 80-89 MM HG: CPT

## 2024-07-16 PROCEDURE — 99213 OFFICE O/P EST LOW 20 MIN: CPT

## 2024-07-16 PROCEDURE — 36415 COLL VENOUS BLD VENIPUNCTURE: CPT

## 2024-07-16 PROCEDURE — 1036F TOBACCO NON-USER: CPT

## 2024-07-16 PROCEDURE — 80048 BASIC METABOLIC PNL TOTAL CA: CPT

## 2024-07-16 PROCEDURE — 1159F MED LIST DOCD IN RCRD: CPT

## 2024-07-16 PROCEDURE — 1160F RVW MEDS BY RX/DR IN RCRD: CPT

## 2024-07-16 RX ORDER — LISINOPRIL AND HYDROCHLOROTHIAZIDE 20; 25 MG/1; MG/1
1 TABLET ORAL EVERY 24 HOURS
Qty: 90 TABLET | Refills: 1 | Status: SHIPPED | OUTPATIENT
Start: 2024-07-16

## 2024-07-16 ASSESSMENT — PAIN SCALES - GENERAL: PAINLEVEL: 0-NO PAIN

## 2024-07-16 ASSESSMENT — ENCOUNTER SYMPTOMS
SHORTNESS OF BREATH: 0
ARTHRALGIAS: 1
DIZZINESS: 0
LIGHT-HEADEDNESS: 0
PALPITATIONS: 0
FATIGUE: 0

## 2024-07-16 ASSESSMENT — PATIENT HEALTH QUESTIONNAIRE - PHQ9
SUM OF ALL RESPONSES TO PHQ9 QUESTIONS 1 AND 2: 0
2. FEELING DOWN, DEPRESSED OR HOPELESS: NOT AT ALL
1. LITTLE INTEREST OR PLEASURE IN DOING THINGS: NOT AT ALL

## 2024-07-16 NOTE — PROGRESS NOTES
"Subjective   Patient ID: Ace Mirza is a 63 y.o. female who presents for Follow-up (6 month fu /la/Discuss cortisone shot helped only for a week /Ingrown toe nail /la).    Hx HTN, vit-D deficiency, + Factor V Leiden (no hx of VTE's), BCC     Other providers: Previous PCP Brionna Dutta, CNP, Dermatology for recurrent BCC every 6 months for skin checks, Dr. Gillian Garrett Ob/gyn, Dr. López Holliday, (hematologist)     HTN: controlled. On lisinopril 20 - 25 mg. No medication side effects. Home BP rarely checks. Denies chest pain, heart palpitations, SOB, dizziness, headaches, changes of vision, syncope, leg swelling    Patient saw. Dr. Hamilton Ornelas at [facility] [place] had cortisone shot in left hip joint for OA, had full pain relieve for one week and overall hip pain is more tolerable. Patient just had questions on when she have next injection, discussed every 3 months minimum.     Ingrown toenail, mutiple, worse of right great toe. Associated pain with pressure but no swelling, redness, or drainage.            Review of Systems   Constitutional:  Negative for fatigue.   Eyes:  Negative for visual disturbance.   Respiratory:  Negative for shortness of breath.    Cardiovascular:  Negative for chest pain, palpitations and leg swelling.   Musculoskeletal:  Positive for arthralgias.   Skin:  Negative for rash.   Neurological:  Negative for dizziness, syncope and light-headedness.       Objective   /80   Pulse 70   Ht 1.6 m (5' 3\")   Wt 102 kg (225 lb)   SpO2 95%   BMI 39.86 kg/m²     Physical Exam  Constitutional:       General: She is not in acute distress.     Appearance: Normal appearance.   Cardiovascular:      Rate and Rhythm: Normal rate and regular rhythm.      Heart sounds: No murmur heard.  Pulmonary:      Effort: Pulmonary effort is normal.      Breath sounds: Normal breath sounds. No wheezing, rhonchi or rales.   Musculoskeletal:      Right lower leg: No edema.      Left lower leg: No " edema.   Feet:      Right foot:      Skin integrity: No skin breakdown or erythema.      Toenail Condition: Right toenails are ingrown.      Left foot:      Skin integrity: No skin breakdown or erythema.      Toenail Condition: Left toenails are ingrown.   Skin:     General: Skin is warm and dry.      Findings: No erythema or rash. Rash is not pustular.   Neurological:      Mental Status: She is alert.   Psychiatric:         Mood and Affect: Mood and affect normal.         Assessment/Plan   Diagnoses and all orders for this visit:  Primary hypertension  -     lisinopriL-hydrochlorothiazide 20-25 mg tablet; Take 1 tablet by mouth once every 24 hours.  -     Basic metabolic panel; Future  Ingrown toenail of both feet  -     Referral to Podiatry; Future  Primary osteoarthritis of left hip  Follow-up 6 months for Medicare physical. Discussed severity of ingrown toenail will need to see podiatrist but to avoid in other toenails can cut straight across and soak feet.

## 2024-11-26 ENCOUNTER — OFFICE VISIT (OUTPATIENT)
Dept: PRIMARY CARE | Facility: CLINIC | Age: 64
End: 2024-11-26
Payer: MEDICARE

## 2024-11-26 VITALS
OXYGEN SATURATION: 95 % | HEART RATE: 85 BPM | HEIGHT: 62 IN | WEIGHT: 220.2 LBS | SYSTOLIC BLOOD PRESSURE: 118 MMHG | DIASTOLIC BLOOD PRESSURE: 84 MMHG | BODY MASS INDEX: 40.52 KG/M2

## 2024-11-26 DIAGNOSIS — J01.90 ACUTE NON-RECURRENT SINUSITIS, UNSPECIFIED LOCATION: Primary | ICD-10-CM

## 2024-11-26 PROCEDURE — 3079F DIAST BP 80-89 MM HG: CPT | Performed by: STUDENT IN AN ORGANIZED HEALTH CARE EDUCATION/TRAINING PROGRAM

## 2024-11-26 PROCEDURE — 99213 OFFICE O/P EST LOW 20 MIN: CPT | Performed by: STUDENT IN AN ORGANIZED HEALTH CARE EDUCATION/TRAINING PROGRAM

## 2024-11-26 PROCEDURE — 1159F MED LIST DOCD IN RCRD: CPT | Performed by: STUDENT IN AN ORGANIZED HEALTH CARE EDUCATION/TRAINING PROGRAM

## 2024-11-26 PROCEDURE — 1123F ACP DISCUSS/DSCN MKR DOCD: CPT | Performed by: STUDENT IN AN ORGANIZED HEALTH CARE EDUCATION/TRAINING PROGRAM

## 2024-11-26 PROCEDURE — 1126F AMNT PAIN NOTED NONE PRSNT: CPT | Performed by: STUDENT IN AN ORGANIZED HEALTH CARE EDUCATION/TRAINING PROGRAM

## 2024-11-26 PROCEDURE — 3008F BODY MASS INDEX DOCD: CPT | Performed by: STUDENT IN AN ORGANIZED HEALTH CARE EDUCATION/TRAINING PROGRAM

## 2024-11-26 PROCEDURE — 1036F TOBACCO NON-USER: CPT | Performed by: STUDENT IN AN ORGANIZED HEALTH CARE EDUCATION/TRAINING PROGRAM

## 2024-11-26 PROCEDURE — 3074F SYST BP LT 130 MM HG: CPT | Performed by: STUDENT IN AN ORGANIZED HEALTH CARE EDUCATION/TRAINING PROGRAM

## 2024-11-26 RX ORDER — DOXYCYCLINE 100 MG/1
100 CAPSULE ORAL 2 TIMES DAILY
Qty: 14 CAPSULE | Refills: 0 | Status: SHIPPED | OUTPATIENT
Start: 2024-11-26 | End: 2024-12-03

## 2024-11-26 RX ORDER — CICLOPIROX 1 G/100ML
SHAMPOO TOPICAL
COMMUNITY
Start: 2024-10-11

## 2024-11-26 RX ORDER — CLOBETASOL PROPIONATE 0.5 MG/ML
SOLUTION TOPICAL
COMMUNITY
Start: 2024-10-11

## 2024-11-26 ASSESSMENT — PAIN SCALES - GENERAL: PAINLEVEL_OUTOF10: 0-NO PAIN

## 2024-11-26 NOTE — PROGRESS NOTES
"Subjective   Patient ID: Ace Mirza is a 64 y.o. female who presents for Cough and Sinusitis.    HPI  65 yo female here for sinusitis  Sinusitis  Symptoms for about 1 week  Feels flushed  Cough and nasal congestion  Pressure behind eyes  Chest heaviness  Taking advil, mucinex    Review of Systems  All pertinent positive symptoms are included in the history of present illness.    All other systems have been reviewed and are negative and noncontributory to this patient's current ailments.     Allergies   Allergen Reactions    Amoxicillin GI Upset, Nausea Only and Nausea/vomiting    Sulfa (Sulfonamide Antibiotics) Rash        Immunization History   Administered Date(s) Administered    Flu vaccine (IIV4), preservative free *Check age/dose* 10/06/2022, 10/18/2023    Flu vaccine, quadrivalent, no egg protein, age 6 month or greater (FLUCELVAX) 10/15/2020    Influenza, injectable, quadrivalent 12/23/2013, 10/09/2015, 12/23/2016, 09/05/2017, 10/17/2018, 11/15/2021    Influenza, seasonal, injectable 10/13/2003, 10/13/2008, 09/21/2009, 09/15/2010, 11/15/2011, 08/27/2012, 10/20/2014    Pfizer Purple Cap SARS-CoV-2 03/17/2021, 04/16/2021, 12/10/2021    Pneumococcal conjugate vaccine, 20-valent (PREVNAR 20) 01/15/2024    RSV, 60 Years And Older (AREXVY) 10/18/2023    Td (adult), unspecified 12/23/2016    Tdap vaccine, age 7 year and older (BOOSTRIX, ADACEL) 04/13/2007    Zoster vaccine, recombinant, adult (SHINGRIX) 10/15/2020, 01/13/2021       Objective   Vitals:    11/26/24 1310   BP: 118/84   Pulse: 85   SpO2: 95%   Weight: 99.9 kg (220 lb 3.2 oz)   Height: 1.575 m (5' 2\")       Physical Exam  CONSTITUTIONAL - well nourished, well developed, looks like stated age, in no acute distress  SKIN - normal skin color and pigmentation. No rash, lesions, or nodules visualized  HEAD - no trauma, normocephalic  EYES - extraocular muscles are intact  ENT - Tms clear B/L  NECK - no neck mass was observed  LUNGS - CTA B/L  CARDIAC - " regular rate and regular rhythm  ABDOMEN -  nondistended  EXTREMITIES - no edema, no deformities  MSK - moves limbs equally  NEUROLOGICAL - alert, oriented and no focal signs  PSYCHIATRIC - alert, pleasant and cordial, age-appropriate  IMMUNOLOGIC - no cervical lymphadenopathy     Assessment/Plan   65 yo female here for sinusitis  Sinusitis  Start doxycycline 100 mg BID    RTC prn

## 2025-01-21 ENCOUNTER — APPOINTMENT (OUTPATIENT)
Dept: PRIMARY CARE | Facility: CLINIC | Age: 65
End: 2025-01-21
Payer: MEDICARE

## 2025-01-28 ENCOUNTER — OFFICE VISIT (OUTPATIENT)
Dept: PRIMARY CARE | Facility: CLINIC | Age: 65
End: 2025-01-28
Payer: MEDICARE

## 2025-01-28 VITALS
HEIGHT: 63 IN | DIASTOLIC BLOOD PRESSURE: 76 MMHG | WEIGHT: 226 LBS | SYSTOLIC BLOOD PRESSURE: 128 MMHG | HEART RATE: 77 BPM | OXYGEN SATURATION: 96 % | BODY MASS INDEX: 40.04 KG/M2

## 2025-01-28 DIAGNOSIS — I10 PRIMARY HYPERTENSION: ICD-10-CM

## 2025-01-28 DIAGNOSIS — Z00.00 ROUTINE GENERAL MEDICAL EXAMINATION AT HEALTH CARE FACILITY: Primary | ICD-10-CM

## 2025-01-28 DIAGNOSIS — R23.2 HOT FLASHES: ICD-10-CM

## 2025-01-28 DIAGNOSIS — R53.83 FATIGUE, UNSPECIFIED TYPE: ICD-10-CM

## 2025-01-28 DIAGNOSIS — L65.9 HAIR LOSS: ICD-10-CM

## 2025-01-28 DIAGNOSIS — Z13.220 SCREENING FOR HYPERLIPIDEMIA: ICD-10-CM

## 2025-01-28 DIAGNOSIS — R68.89 HEAT INTOLERANCE: ICD-10-CM

## 2025-01-28 PROCEDURE — 3008F BODY MASS INDEX DOCD: CPT

## 2025-01-28 PROCEDURE — 1158F ADVNC CARE PLAN TLK DOCD: CPT

## 2025-01-28 PROCEDURE — 1126F AMNT PAIN NOTED NONE PRSNT: CPT

## 2025-01-28 PROCEDURE — G0439 PPPS, SUBSEQ VISIT: HCPCS

## 2025-01-28 PROCEDURE — 3074F SYST BP LT 130 MM HG: CPT

## 2025-01-28 PROCEDURE — 1123F ACP DISCUSS/DSCN MKR DOCD: CPT

## 2025-01-28 PROCEDURE — 1036F TOBACCO NON-USER: CPT

## 2025-01-28 PROCEDURE — 99214 OFFICE O/P EST MOD 30 MIN: CPT | Mod: 25

## 2025-01-28 PROCEDURE — 99397 PER PM REEVAL EST PAT 65+ YR: CPT | Mod: CSA

## 2025-01-28 PROCEDURE — 99215 OFFICE O/P EST HI 40 MIN: CPT

## 2025-01-28 PROCEDURE — 99214 OFFICE O/P EST MOD 30 MIN: CPT

## 2025-01-28 PROCEDURE — 99397 PER PM REEVAL EST PAT 65+ YR: CPT

## 2025-01-28 PROCEDURE — 3078F DIAST BP <80 MM HG: CPT

## 2025-01-28 PROCEDURE — 1159F MED LIST DOCD IN RCRD: CPT

## 2025-01-28 RX ORDER — LISINOPRIL AND HYDROCHLOROTHIAZIDE 20; 25 MG/1; MG/1
1 TABLET ORAL EVERY 24 HOURS
Qty: 90 TABLET | Refills: 1 | Status: SHIPPED | OUTPATIENT
Start: 2025-01-28

## 2025-01-28 ASSESSMENT — PAIN SCALES - GENERAL: PAINLEVEL_OUTOF10: 0-NO PAIN

## 2025-01-28 ASSESSMENT — ENCOUNTER SYMPTOMS
FEVER: 0
DIARRHEA: 0
DIFFICULTY URINATING: 0
PALPITATIONS: 0
DYSURIA: 0
BLOOD IN STOOL: 0
ADENOPATHY: 0
ARTHRALGIAS: 1
BACK PAIN: 1
LIGHT-HEADEDNESS: 0
NAUSEA: 0
MYALGIAS: 1
SHORTNESS OF BREATH: 0
UNEXPECTED WEIGHT CHANGE: 1
FATIGUE: 1
WHEEZING: 0
COUGH: 0
HEMATURIA: 0
VOMITING: 0
SORE THROAT: 0

## 2025-01-28 ASSESSMENT — COGNITIVE AND FUNCTIONAL STATUS - GENERAL: VERBAL FLUENCY - ANIMAL NAMES (0 TO 25): 3

## 2025-01-28 ASSESSMENT — PATIENT HEALTH QUESTIONNAIRE - PHQ9
2. FEELING DOWN, DEPRESSED OR HOPELESS: NOT AT ALL
SUM OF ALL RESPONSES TO PHQ9 QUESTIONS 1 AND 2: 0
1. LITTLE INTEREST OR PLEASURE IN DOING THINGS: NOT AT ALL

## 2025-01-28 NOTE — PROGRESS NOTES
[facility]: FAMILY MEDICINE  MEDICARE WELLNESS EXAM      Ace Mirza is a 64 y.o. female that is presenting today for Annual Exam (Patient thinks her body maybe having regulating temperature/dd/Patient saw ortho for hips and back, recommended TX for compressed discs, would like to discuss/dd/Patient she is having mild hair loss she is concerned with/dd) and Med Refill (Lisinopril/dd).    Subjective   Hx HTN, vit-D deficiency, + Factor V Leiden (no hx of VTE's), BCC     Other providers: Dermatology for recurrent BCC every 6 months for skin checks, Dr. Gillian Garrett Ob/gyn, Dr. López Holliday, (hematologist), Dr. Villasenor (Ortho at [facility] orth[facility])     HTN: controlled. On lisinopril 20 - 25 mg. No medication side effects. Home BP rarely checks. Denies chest pain, heart palpitations, SOB, dizziness, headaches, changes of vision, syncope, leg swelling    Acute concerns: trouble to regulating temperature. Patient in the summer was out in sun, and saturnino was struggling in the sun, felt lethargic and light-headed. It took hours to cool down even in A/C. Then recently this winter could not warm up. Patient also saw dermatologist for hair loss, gave her a list of labs to check. I will add them to current orders, and can review with dermatologist. +fatigue and weight gain.                   Denies changes or concerns with hearing.   Review of Systems   Constitutional:  Positive for fatigue and unexpected weight change. Negative for fever.   HENT:  Negative for congestion, hearing loss and sore throat.    Eyes:  Negative for visual disturbance.   Respiratory:  Negative for cough, shortness of breath and wheezing.    Cardiovascular:  Negative for chest pain, palpitations and leg swelling.   Gastrointestinal:  Negative for blood in stool, diarrhea, nausea and vomiting.   Endocrine: Positive for cold intolerance and heat intolerance.   Genitourinary:  Negative for difficulty urinating, dysuria and hematuria.    Musculoskeletal:  Positive for arthralgias, back pain and myalgias.   Skin:  Negative for rash.   Allergic/Immunologic: Negative for immunocompromised state.   Neurological:  Negative for syncope and light-headedness.   Hematological:  Negative for adenopathy.   Psychiatric/Behavioral:  Negative for suicidal ideas.      ACTIVITIES OF DAILY LIVING:  Basic ADLs:  Problems with Bathing, Dressing, Toileting, Transferring, Continence, Feeding No  Instrumental ADLs:  Problems with Ability to use phone, Shopping, Cooking, House-keeping, Laundry, Transportation, Medication Management, Finance Management No    Advance Care Planning   Advanced Care Planning was discussed with patient:  The patient has an active surrogate decision-maker on file. The patient does not have an advanced care plan on file.    History    Past Medical History:   Diagnosis Date    Hypertension     Personal history of other diseases of the circulatory system     History of hypertension    Personal history of other malignant neoplasm of skin     History of basal cell carcinoma (BCC)     Past Surgical History:   Procedure Laterality Date    BREAST LUMPECTOMY  2018    Breast Surgery Lumpectomy     SECTION, CLASSIC  2018     Section    OTHER SURGICAL HISTORY  2018    Cervical Conization By Laser    OTHER SURGICAL HISTORY  2018    Uterine Surgery    SKIN CANCER DESTRUCTION       Family History   Problem Relation Name Age of Onset    Lung disease Mother      Stroke Mother      Pulmonary fibrosis Mother      Melanoma Mother      Hypertension Mother      Arthritis Mother      Other (liposarcoma) Father      Kidney disease Father      Diabetes Father      Thyroid cancer Sister      Melanoma Sister      Colon cancer Mother's Brother      Breast cancer Neg Hx      Uterine cancer Neg Hx      Cervical cancer Neg Hx      Ovarian cancer Neg Hx      Prostate cancer Neg Hx      Heart attack Neg Hx       Allergies   Allergen  Reactions    Amoxicillin GI Upset, Nausea Only and Nausea/vomiting    Sulfa (Sulfonamide Antibiotics) Rash     Current Outpatient Medications on File Prior to Visit   Medication Sig Dispense Refill    ciclopirox 1 % shampoo       clobetasol (Temovate) 0.05 % external solution       D3-red wine-resveratrol-malt 5,000-200 unit-mg capsule once every 24 hours.      omega 3-dha-epa-fish oil 600 mg-216 mg- 324 mg-1,200 mg capsule 1 capsule (1,200 mg) once every 24 hours.      [DISCONTINUED] lisinopriL-hydrochlorothiazide 20-25 mg tablet Take 1 tablet by mouth once every 24 hours. 90 tablet 1     No current facility-administered medications on file prior to visit.     Immunization History   Administered Date(s) Administered    COVID-19, mRNA, LNP-S, PF, 30 mcg/0.3 mL dose 03/17/2021, 04/16/2021    Flu vaccine (IIV4), preservative free *Check age/dose* 10/06/2022, 10/18/2023    Flu vaccine, quadrivalent, no egg protein, age 6 month or greater (FLUCELVAX) 10/15/2020    Influenza, injectable, quadrivalent 12/23/2013, 10/09/2015, 12/23/2016, 09/05/2017, 10/17/2018, 11/15/2021    Influenza, seasonal, injectable 10/13/2003, 10/13/2008, 09/21/2009, 09/15/2010, 11/15/2011, 08/27/2012, 10/20/2014    Influenza, trivalent, adjuvanted 01/17/2025    Pfizer Purple Cap SARS-CoV-2 12/10/2021    Pneumococcal conjugate vaccine, 20-valent (PREVNAR 20) 01/15/2024    RSV, 60 Years And Older (AREXVY) 10/18/2023    Td (adult), unspecified 12/23/2016    Td vaccine, age 7 years and older (TDVAX) 12/23/2016    Tdap vaccine, age 7 year and older (BOOSTRIX, ADACEL) 04/13/2007    Zoster vaccine, recombinant, adult (SHINGRIX) 10/15/2020, 01/13/2021     Patient's medical history was reviewed and updated either before or during this encounter.    Objective   Vitals:    01/28/25 0951   BP: 128/76   Pulse: 77   SpO2: 96%      Physical Exam  Constitutional:       General: She is not in acute distress.     Appearance: Normal appearance.   HENT:      Right  Ear: Tympanic membrane and ear canal normal.      Left Ear: Tympanic membrane and ear canal normal.      Nose: Nose normal.      Mouth/Throat:      Mouth: Mucous membranes are moist.      Pharynx: Oropharynx is clear. No oropharyngeal exudate or posterior oropharyngeal erythema.   Eyes:      Extraocular Movements: Extraocular movements intact.      Conjunctiva/sclera: Conjunctivae normal.      Pupils: Pupils are equal, round, and reactive to light.   Neck:      Thyroid: No thyroid mass or thyromegaly.      Vascular: No carotid bruit.   Cardiovascular:      Rate and Rhythm: Normal rate and regular rhythm.      Pulses: Normal pulses.      Heart sounds: No murmur heard.     No gallop.   Pulmonary:      Effort: Pulmonary effort is normal.      Breath sounds: No wheezing, rhonchi or rales.   Abdominal:      General: Bowel sounds are normal.      Palpations: Abdomen is soft. There is no hepatomegaly, splenomegaly or mass.      Tenderness: There is no abdominal tenderness. There is no guarding.   Musculoskeletal:         General: Normal range of motion.      Right lower leg: No edema.      Left lower leg: No edema.   Lymphadenopathy:      Cervical: No cervical adenopathy.   Skin:     General: Skin is warm.      Findings: No rash.   Neurological:      General: No focal deficit present.      Mental Status: She is alert.      Motor: Motor function is intact. No weakness.   Psychiatric:         Mood and Affect: Mood normal.         Thought Content: Thought content normal.         Assessment/Plan    Diagnoses and all orders for this visit:  Routine general medical examination at health care facility  -     1 Year Follow Up In Primary Care - Wellness Exam; Future  -     Comprehensive Metabolic Panel; Future  Primary hypertension  -     lisinopriL-hydrochlorothiazide 20-25 mg tablet; Take 1 tablet by mouth once every 24 hours.  Screening for hyperlipidemia  -     Lipid Panel; Future  Hot flashes  Heat intolerance  -     CBC and  Auto Differential; Future  -     Tsh With Reflex To Free T4 If Abnormal; Future  -     FSH & LH; Future  -     Testosterone, total and free; Future  -     Androstenedione; Future  Hair loss  -     Iron and TIBC; Future  -     Ferritin; Future  -     MADISON with Reflex to SOSA; Future  -     Rheumatoid factor; Future  -     Sedimentation Rate; Future  -     FSH & LH; Future  -     Testosterone, total and free; Future  -     Androstenedione; Future  Fatigue, unspecified type  -     Iron and TIBC; Future  -     Ferritin; Future  -     MADISON with Reflex to SOSA; Future  -     Rheumatoid factor; Future  -     Sedimentation Rate; Future  -     FSH & LH; Future  -     Testosterone, total and free; Future  -     Androstenedione; Future    Follow-up 6 months for HTN. Also encouraged aptient ot try inversion her ortho wants her to try for lower back pain/compressed disc, and can see Dr. Esquivel (chiropractor).     Patient Active Problem List   Diagnosis    Primary hypertension    Vitamin D deficiency    Factor V Leiden mutation (Multi)    Family history of thyroid cancer    Endometrial polyp    Fibroid uterus    FRANCK (generalized anxiety disorder)    History of basal cell carcinoma (BCC)    Menometrorrhagia    Perimenopausal disorder    Post-menopausal bleeding    Primary insomnia    Lesion of vulva    Primary osteoarthritis of left hip       The patient was encouraged to ensure that any/all documentation is accurate and up to date, and that our office be provided a copy in the event that anything changes.    Cara Jett PA-C

## 2025-01-30 ENCOUNTER — TELEPHONE (OUTPATIENT)
Dept: PRIMARY CARE | Facility: CLINIC | Age: 65
End: 2025-01-30
Payer: MEDICARE

## 2025-01-30 NOTE — TELEPHONE ENCOUNTER
Two more labs pending but overall labs are normal except for slight elevation in triglycerides and liver enzymes. This can be improved with diet and exercise and we will re-check liver enzymes in 6 months to make sure no further increase. She also does have very mild elevation in one of her inflammatory markers called ESR, this more likely due to inflammation in her back she is currently being treated for.

## 2025-02-01 LAB
ALBUMIN SERPL-MCNC: 4.3 G/DL (ref 3.6–5.1)
ALP SERPL-CCNC: 56 U/L (ref 37–153)
ALT SERPL-CCNC: 41 U/L (ref 6–29)
ANA SER QL IF: NEGATIVE
ANDROST SERPL-MCNC: NORMAL NG/ML
ANION GAP SERPL CALCULATED.4IONS-SCNC: 10 MMOL/L (CALC) (ref 7–17)
AST SERPL-CCNC: 25 U/L (ref 10–35)
BASOPHILS # BLD AUTO: 68 CELLS/UL (ref 0–200)
BASOPHILS NFR BLD AUTO: 0.9 %
BILIRUB SERPL-MCNC: 0.7 MG/DL (ref 0.2–1.2)
BUN SERPL-MCNC: 18 MG/DL (ref 7–25)
CALCIUM SERPL-MCNC: 10.1 MG/DL (ref 8.6–10.4)
CHLORIDE SERPL-SCNC: 106 MMOL/L (ref 98–110)
CHOLEST SERPL-MCNC: 172 MG/DL
CHOLEST/HDLC SERPL: 4.2 (CALC)
CO2 SERPL-SCNC: 25 MMOL/L (ref 20–32)
CREAT SERPL-MCNC: 0.62 MG/DL (ref 0.5–1.05)
EGFRCR SERPLBLD CKD-EPI 2021: 98 ML/MIN/1.73M2
EOSINOPHIL # BLD AUTO: 114 CELLS/UL (ref 15–500)
EOSINOPHIL NFR BLD AUTO: 1.5 %
ERYTHROCYTE [DISTWIDTH] IN BLOOD BY AUTOMATED COUNT: 13 % (ref 11–15)
ERYTHROCYTE [SEDIMENTATION RATE] IN BLOOD BY WESTERGREN METHOD: 38 MM/H
FERRITIN SERPL-MCNC: 110 NG/ML (ref 16–288)
FSH SERPL-ACNC: 57.6 MIU/ML
GLUCOSE SERPL-MCNC: 91 MG/DL (ref 65–99)
HCT VFR BLD AUTO: 43.7 % (ref 35–45)
HDLC SERPL-MCNC: 41 MG/DL
HGB BLD-MCNC: 14.3 G/DL (ref 11.7–15.5)
IRON SATN MFR SERPL: 33 % (CALC) (ref 16–45)
IRON SERPL-MCNC: 90 MCG/DL (ref 45–160)
LDLC SERPL CALC-MCNC: 99 MG/DL (CALC)
LH SERPL-ACNC: 24.3 MIU/ML
LYMPHOCYTES # BLD AUTO: 2599 CELLS/UL (ref 850–3900)
LYMPHOCYTES NFR BLD AUTO: 34.2 %
MCH RBC QN AUTO: 31.8 PG (ref 27–33)
MCHC RBC AUTO-ENTMCNC: 32.7 G/DL (ref 32–36)
MCV RBC AUTO: 97.1 FL (ref 80–100)
MONOCYTES # BLD AUTO: 410 CELLS/UL (ref 200–950)
MONOCYTES NFR BLD AUTO: 5.4 %
NEUTROPHILS # BLD AUTO: 4408 CELLS/UL (ref 1500–7800)
NEUTROPHILS NFR BLD AUTO: 58 %
NONHDLC SERPL-MCNC: 131 MG/DL (CALC)
PLATELET # BLD AUTO: 329 THOUSAND/UL (ref 140–400)
PMV BLD REES-ECKER: 10.3 FL (ref 7.5–12.5)
POTASSIUM SERPL-SCNC: 4.1 MMOL/L (ref 3.5–5.3)
PROT SERPL-MCNC: 6.5 G/DL (ref 6.1–8.1)
RBC # BLD AUTO: 4.5 MILLION/UL (ref 3.8–5.1)
RHEUMATOID FACT SERPL-ACNC: <10 IU/ML
SODIUM SERPL-SCNC: 141 MMOL/L (ref 135–146)
TESTOST FREE SERPL-MCNC: 8 PG/ML (ref 0.1–6.4)
TESTOST SERPL-MCNC: 45 NG/DL (ref 2–45)
TIBC SERPL-MCNC: 270 MCG/DL (CALC) (ref 250–450)
TRIGL SERPL-MCNC: 202 MG/DL
TSH SERPL-ACNC: 2.37 MIU/L (ref 0.4–4.5)
WBC # BLD AUTO: 7.6 THOUSAND/UL (ref 3.8–10.8)

## 2025-02-04 LAB
ALBUMIN SERPL-MCNC: 4.3 G/DL (ref 3.6–5.1)
ALP SERPL-CCNC: 56 U/L (ref 37–153)
ALT SERPL-CCNC: 41 U/L (ref 6–29)
ANA SER QL IF: NEGATIVE
ANDROST SERPL-MCNC: 63 NG/DL
ANION GAP SERPL CALCULATED.4IONS-SCNC: 10 MMOL/L (CALC) (ref 7–17)
AST SERPL-CCNC: 25 U/L (ref 10–35)
BASOPHILS # BLD AUTO: 68 CELLS/UL (ref 0–200)
BASOPHILS NFR BLD AUTO: 0.9 %
BILIRUB SERPL-MCNC: 0.7 MG/DL (ref 0.2–1.2)
BUN SERPL-MCNC: 18 MG/DL (ref 7–25)
CALCIUM SERPL-MCNC: 10.1 MG/DL (ref 8.6–10.4)
CHLORIDE SERPL-SCNC: 106 MMOL/L (ref 98–110)
CHOLEST SERPL-MCNC: 172 MG/DL
CHOLEST/HDLC SERPL: 4.2 (CALC)
CO2 SERPL-SCNC: 25 MMOL/L (ref 20–32)
CREAT SERPL-MCNC: 0.62 MG/DL (ref 0.5–1.05)
EGFRCR SERPLBLD CKD-EPI 2021: 98 ML/MIN/1.73M2
EOSINOPHIL # BLD AUTO: 114 CELLS/UL (ref 15–500)
EOSINOPHIL NFR BLD AUTO: 1.5 %
ERYTHROCYTE [DISTWIDTH] IN BLOOD BY AUTOMATED COUNT: 13 % (ref 11–15)
ERYTHROCYTE [SEDIMENTATION RATE] IN BLOOD BY WESTERGREN METHOD: 38 MM/H
FERRITIN SERPL-MCNC: 110 NG/ML (ref 16–288)
FSH SERPL-ACNC: 57.6 MIU/ML
GLUCOSE SERPL-MCNC: 91 MG/DL (ref 65–99)
HCT VFR BLD AUTO: 43.7 % (ref 35–45)
HDLC SERPL-MCNC: 41 MG/DL
HGB BLD-MCNC: 14.3 G/DL (ref 11.7–15.5)
IRON SATN MFR SERPL: 33 % (CALC) (ref 16–45)
IRON SERPL-MCNC: 90 MCG/DL (ref 45–160)
LDLC SERPL CALC-MCNC: 99 MG/DL (CALC)
LH SERPL-ACNC: 24.3 MIU/ML
LYMPHOCYTES # BLD AUTO: 2599 CELLS/UL (ref 850–3900)
LYMPHOCYTES NFR BLD AUTO: 34.2 %
MCH RBC QN AUTO: 31.8 PG (ref 27–33)
MCHC RBC AUTO-ENTMCNC: 32.7 G/DL (ref 32–36)
MCV RBC AUTO: 97.1 FL (ref 80–100)
MONOCYTES # BLD AUTO: 410 CELLS/UL (ref 200–950)
MONOCYTES NFR BLD AUTO: 5.4 %
NEUTROPHILS # BLD AUTO: 4408 CELLS/UL (ref 1500–7800)
NEUTROPHILS NFR BLD AUTO: 58 %
NONHDLC SERPL-MCNC: 131 MG/DL (CALC)
PLATELET # BLD AUTO: 329 THOUSAND/UL (ref 140–400)
PMV BLD REES-ECKER: 10.3 FL (ref 7.5–12.5)
POTASSIUM SERPL-SCNC: 4.1 MMOL/L (ref 3.5–5.3)
PROT SERPL-MCNC: 6.5 G/DL (ref 6.1–8.1)
RBC # BLD AUTO: 4.5 MILLION/UL (ref 3.8–5.1)
RHEUMATOID FACT SERPL-ACNC: <10 IU/ML
SODIUM SERPL-SCNC: 141 MMOL/L (ref 135–146)
TESTOST FREE SERPL-MCNC: 8 PG/ML (ref 0.1–6.4)
TESTOST SERPL-MCNC: 45 NG/DL (ref 2–45)
TIBC SERPL-MCNC: 270 MCG/DL (CALC) (ref 250–450)
TRIGL SERPL-MCNC: 202 MG/DL
TSH SERPL-ACNC: 2.37 MIU/L (ref 0.4–4.5)
WBC # BLD AUTO: 7.6 THOUSAND/UL (ref 3.8–10.8)

## 2025-04-02 NOTE — PROGRESS NOTES
Subjective   Patient ID: Ace Mirza is a 64 y.o. female who presents for Annual Exam.    PAP 1-16-24 NEG NEG; 12-5-19 NEG HPV NEG H/O cone biopsy 30 years ago.  MAMMO 2-10-24, did fast breast MRI.  DEXA 1-30-23  COLON 2021 good for 10 years.        Cramping has not been as bad this past year. Had ultrasound last year with large fibroid uterus. No VB.     Gets mams at [facility], had biopsy on the left, has a chip. Has a h/o basal cell carcinoma, had some removed on leg.     Stopped periods with fibroid removal several ago. Had a fibroid removed vaginally. No VB.     - passed couple years ago, Covid, c/s x2. Retired last year. Takes vit D. Grandkids 9 and 6.     Father liposarcoma, passed. Mom living, healthy. no female cancers.     No vaginal dryness.       Review of Systems   Musculoskeletal:  Positive for back pain.   All other systems reviewed and are negative.      Objective   Constitutional: Alert and in no acute distress. Well developed, well nourished.  Neck: no neck asymmetry. Supple and thyroid not enlarged and there were no palpable thyroid nodules.  Chest: Breasts normal appearance, no nipple discharge and no skin changes and palpation of breasts and axillae: no palpable mass and no axillary lymphadenopathy.  Abdomen: soft nontender; no abdominal mass palpated, no organomegaly and no hernias.  Genitourinary: external genitalia: normal, no inguinal lymphadenopathy, Bartholin's urethral and Silerton's glands: normal, urethra: normal and bladder: normal on palpation.  Vagina: normal.  Cervix: normal.  Uterus: normal.   Right adnexa/parametria: normal.  Left adnexa/parametria: normal.  Skin: normal skin color and pigmentation, normal skin turgor and no rash.  Psychiatric: alert and oriented x 3, affect normal to patient baseline and mood appropriate.     Assessment/Plan   -no pap  -dorota-katya, declines MRI this year.

## 2025-04-03 ENCOUNTER — APPOINTMENT (OUTPATIENT)
Dept: OBSTETRICS AND GYNECOLOGY | Facility: CLINIC | Age: 65
End: 2025-04-03
Payer: MEDICARE

## 2025-04-03 VITALS
DIASTOLIC BLOOD PRESSURE: 80 MMHG | WEIGHT: 228 LBS | SYSTOLIC BLOOD PRESSURE: 126 MMHG | BODY MASS INDEX: 40.4 KG/M2 | HEIGHT: 63 IN

## 2025-04-03 DIAGNOSIS — Z01.419 WELL WOMAN EXAM WITH ROUTINE GYNECOLOGICAL EXAM: Primary | ICD-10-CM

## 2025-04-03 DIAGNOSIS — Z12.31 ENCOUNTER FOR SCREENING MAMMOGRAM FOR BREAST CANCER: ICD-10-CM

## 2025-04-03 PROCEDURE — 1160F RVW MEDS BY RX/DR IN RCRD: CPT | Performed by: OBSTETRICS & GYNECOLOGY

## 2025-04-03 PROCEDURE — 3008F BODY MASS INDEX DOCD: CPT | Performed by: OBSTETRICS & GYNECOLOGY

## 2025-04-03 PROCEDURE — 3074F SYST BP LT 130 MM HG: CPT | Performed by: OBSTETRICS & GYNECOLOGY

## 2025-04-03 PROCEDURE — 3079F DIAST BP 80-89 MM HG: CPT | Performed by: OBSTETRICS & GYNECOLOGY

## 2025-04-03 PROCEDURE — 1036F TOBACCO NON-USER: CPT | Performed by: OBSTETRICS & GYNECOLOGY

## 2025-04-03 PROCEDURE — 1123F ACP DISCUSS/DSCN MKR DOCD: CPT | Performed by: OBSTETRICS & GYNECOLOGY

## 2025-04-03 PROCEDURE — 99397 PER PM REEVAL EST PAT 65+ YR: CPT | Performed by: OBSTETRICS & GYNECOLOGY

## 2025-04-03 PROCEDURE — 1159F MED LIST DOCD IN RCRD: CPT | Performed by: OBSTETRICS & GYNECOLOGY

## 2025-04-03 ASSESSMENT — ENCOUNTER SYMPTOMS: BACK PAIN: 1

## 2025-04-11 ENCOUNTER — HOSPITAL ENCOUNTER (OUTPATIENT)
Dept: RADIOLOGY | Facility: CLINIC | Age: 65
Discharge: HOME | End: 2025-04-11
Payer: MEDICARE

## 2025-04-11 VITALS — BODY MASS INDEX: 40.4 KG/M2 | WEIGHT: 228 LBS | HEIGHT: 63 IN

## 2025-04-11 DIAGNOSIS — Z12.31 ENCOUNTER FOR SCREENING MAMMOGRAM FOR BREAST CANCER: ICD-10-CM

## 2025-04-11 PROCEDURE — 77063 BREAST TOMOSYNTHESIS BI: CPT

## 2025-07-29 ENCOUNTER — OFFICE VISIT (OUTPATIENT)
Dept: PRIMARY CARE | Facility: CLINIC | Age: 65
End: 2025-07-29
Payer: MEDICARE

## 2025-07-29 VITALS
SYSTOLIC BLOOD PRESSURE: 142 MMHG | WEIGHT: 220 LBS | HEART RATE: 70 BPM | DIASTOLIC BLOOD PRESSURE: 82 MMHG | BODY MASS INDEX: 39.6 KG/M2 | OXYGEN SATURATION: 96 %

## 2025-07-29 DIAGNOSIS — G56.03 CARPAL TUNNEL SYNDROME, BILATERAL: ICD-10-CM

## 2025-07-29 DIAGNOSIS — R79.89 ELEVATED LIVER FUNCTION TESTS: ICD-10-CM

## 2025-07-29 DIAGNOSIS — R79.89 ELEVATED TESTOSTERONE LEVEL: ICD-10-CM

## 2025-07-29 DIAGNOSIS — L65.9 HAIR LOSS: ICD-10-CM

## 2025-07-29 DIAGNOSIS — I10 PRIMARY HYPERTENSION: Primary | ICD-10-CM

## 2025-07-29 PROCEDURE — G2211 COMPLEX E/M VISIT ADD ON: HCPCS

## 2025-07-29 PROCEDURE — 1159F MED LIST DOCD IN RCRD: CPT

## 2025-07-29 PROCEDURE — 99214 OFFICE O/P EST MOD 30 MIN: CPT

## 2025-07-29 PROCEDURE — 3077F SYST BP >= 140 MM HG: CPT

## 2025-07-29 PROCEDURE — 3079F DIAST BP 80-89 MM HG: CPT

## 2025-07-29 PROCEDURE — 1036F TOBACCO NON-USER: CPT

## 2025-07-29 PROCEDURE — 1126F AMNT PAIN NOTED NONE PRSNT: CPT

## 2025-07-29 ASSESSMENT — PATIENT HEALTH QUESTIONNAIRE - PHQ9
1. LITTLE INTEREST OR PLEASURE IN DOING THINGS: NOT AT ALL
2. FEELING DOWN, DEPRESSED OR HOPELESS: NOT AT ALL
SUM OF ALL RESPONSES TO PHQ9 QUESTIONS 1 AND 2: 0

## 2025-07-29 ASSESSMENT — ENCOUNTER SYMPTOMS
LIGHT-HEADEDNESS: 0
CHILLS: 0
PALPITATIONS: 0
DIZZINESS: 0
SHORTNESS OF BREATH: 0
FEVER: 0

## 2025-07-29 ASSESSMENT — PAIN SCALES - GENERAL: PAINLEVEL_OUTOF10: 0-NO PAIN

## 2025-07-29 NOTE — PROGRESS NOTES
Subjective   Patient ID: Ace Mirza is a 64 y.o. female who presents for Blood Pressure Check (Pt is here for follow up / nr), Results (Pt would like to discuss bloodwork results from Jan / nr), and Tingling (Pt has noticed hand tingling on and off / nr).    Hx HTN, vit-D deficiency, + Factor V Leiden (no hx of VTE's), BCC     Other providers: Dermatology for recurrent BCC every 6 months for skin checks, Dr. Gillian Garrett Ob/gyn, Dr. López Holliday, (hematologist), Dr. Villasenor (Ortho at [facility] orth[facility])     HTN: elevated, patient states ate out twice yesterday. On lisinopril- HCTZ 20 - 25 mg. No medication side effects. Home BP rarely checks. Denies chest pain, heart palpitations, SOB, dizziness, headaches, changes of vision, syncope, leg swelling    Acute concerns:   1) wants to discuss labs from January, especially elevated Testosterone and dermatologist recommended possible spirolactone to help with hair loss. Patient feels hair loss has been improving and not sure wants to start medication at this time.  2) Numbness in hands, more often when she sleeps and sometimes when she drives. Only last a minute or so. Not overly bothersome at this point.                  Review of Systems   Constitutional:  Negative for chills and fever.   Eyes:  Negative for visual disturbance.   Respiratory:  Negative for shortness of breath.    Cardiovascular:  Negative for chest pain, palpitations and leg swelling.   Neurological:  Negative for dizziness, syncope and light-headedness.       Objective   /82   Pulse 70   Wt 99.8 kg (220 lb)   SpO2 96%   BMI 39.60 kg/m²     Physical Exam  Constitutional:       General: She is not in acute distress.     Appearance: Normal appearance.     Cardiovascular:      Rate and Rhythm: Normal rate and regular rhythm.      Heart sounds: No murmur heard.  Pulmonary:      Effort: Pulmonary effort is normal.      Breath sounds: Normal breath sounds. No wheezing, rhonchi or rales.      Musculoskeletal:      Right lower leg: No edema.      Left lower leg: No edema.     Skin:     General: Skin is warm and dry.      Findings: No rash.     Neurological:      Mental Status: She is alert.     Psychiatric:         Mood and Affect: Mood and affect normal.         Assessment/Plan   Diagnoses and all orders for this visit:  Primary hypertension  -     Comprehensive metabolic panel; Future  Elevated testosterone level  -     Testosterone, total and free; Future  -     Sedimentation Rate; Future  Elevated liver function tests  -     Comprehensive metabolic panel; Future  -     Sedimentation Rate; Future  Hair loss  -     QUEST INSULIN; Future  -     Sedimentation Rate; Future  Carpal tunnel syndrome, bilateral  Discussed adding in Spirolactone for hair loss and BP, but would have to adjust other medications (Lisinopril), SDM to re-check labs first and also wait to re-check BP in 3 months if still elevated. Sees derm in 3 months as well. Use carpal tunnel exercise for hand numbness.

## 2025-08-01 ENCOUNTER — RESULTS FOLLOW-UP (OUTPATIENT)
Dept: PRIMARY CARE | Facility: CLINIC | Age: 65
End: 2025-08-01
Payer: MEDICARE

## 2025-08-03 LAB
ALBUMIN SERPL-MCNC: 4.5 G/DL (ref 3.6–5.1)
ALP SERPL-CCNC: 63 U/L (ref 37–153)
ALT SERPL-CCNC: 34 U/L (ref 6–29)
ANION GAP SERPL CALCULATED.4IONS-SCNC: 10 MMOL/L (CALC) (ref 7–17)
AST SERPL-CCNC: 23 U/L (ref 10–35)
BILIRUB SERPL-MCNC: 0.9 MG/DL (ref 0.2–1.2)
BUN SERPL-MCNC: 19 MG/DL (ref 7–25)
CALCIUM SERPL-MCNC: 10.1 MG/DL (ref 8.6–10.4)
CHLORIDE SERPL-SCNC: 104 MMOL/L (ref 98–110)
CO2 SERPL-SCNC: 26 MMOL/L (ref 20–32)
CREAT SERPL-MCNC: 0.68 MG/DL (ref 0.5–1.05)
EGFRCR SERPLBLD CKD-EPI 2021: 96 ML/MIN/1.73M2
ERYTHROCYTE [SEDIMENTATION RATE] IN BLOOD BY WESTERGREN METHOD: 33 MM/H
GLUCOSE SERPL-MCNC: 97 MG/DL (ref 65–99)
INSULIN SERPL-ACNC: 19.1 UIU/ML
POTASSIUM SERPL-SCNC: 4.1 MMOL/L (ref 3.5–5.3)
PROT SERPL-MCNC: 6.8 G/DL (ref 6.1–8.1)
SODIUM SERPL-SCNC: 140 MMOL/L (ref 135–146)
TESTOST FREE SERPL-MCNC: 6.9 PG/ML (ref 0.1–6.4)
TESTOST SERPL-MCNC: 46 NG/DL (ref 2–45)

## 2025-08-04 NOTE — TELEPHONE ENCOUNTER
Spoke with patient, she said she will see you in 3 months and go over options with you at that time.

## 2025-08-04 NOTE — TELEPHONE ENCOUNTER
----- Message from Cara Jett PA-C sent at 8/4/2025 10:55 AM EDT -----      ----- Message -----  From: [facility] Results In  Sent: 7/30/2025   5:46 AM EDT  To: Cara Jett PA-C

## 2025-08-04 NOTE — TELEPHONE ENCOUNTER
----- Message from Cara Jett PA-C sent at 8/4/2025 10:55 AM EDT -----      ----- Message -----  From: [facility] Results In  Sent: 7/30/2025   5:46 AM EDT  To: Cara Jett PA-C

## 2025-08-04 NOTE — TELEPHONE ENCOUNTER
Liver function and inflammatory markers have improving, but testosterone elevation is about the same. In addition does have elevated insulin levels which can contribute to high testosterone, hair loss, fatigue, weight gain.     We can try the insulin resistance with improved diet and exercise or consider low dose metformin.    We can also treat the high testosterone/hair loss with spirolactone as discussed.     She can consider options and also talk it over with her dermatologist if she would like and let me know what she would like to do.